# Patient Record
Sex: FEMALE | Race: WHITE | Employment: FULL TIME | URBAN - METROPOLITAN AREA
[De-identification: names, ages, dates, MRNs, and addresses within clinical notes are randomized per-mention and may not be internally consistent; named-entity substitution may affect disease eponyms.]

---

## 2019-03-26 ENCOUNTER — HOSPITAL ENCOUNTER (OUTPATIENT)
Age: 29
Discharge: HOME | End: 2019-03-26
Attending: EMERGENCY MEDICINE
Payer: COMMERCIAL

## 2019-03-26 VITALS
RESPIRATION RATE: 18 BRPM | SYSTOLIC BLOOD PRESSURE: 122 MMHG | HEIGHT: 64 IN | HEART RATE: 65 BPM | DIASTOLIC BLOOD PRESSURE: 70 MMHG | BODY MASS INDEX: 31.58 KG/M2 | TEMPERATURE: 98 F | WEIGHT: 185 LBS | OXYGEN SATURATION: 99 %
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
## 2019-03-26 PROCEDURE — 99203 OFFICE O/P NEW LOW 30 MIN: CPT

## 2019-03-26 RX ORDER — DOXYCYCLINE HYCLATE 100 MG/1
100 CAPSULE ORAL 2 TIMES DAILY
Qty: 20 CAPSULE | Refills: 0 | Status: SHIPPED | OUTPATIENT
Start: 2019-03-26 | End: 2019-04-05

## 2019-03-26 RX ORDER — NORGESTIMATE AND ETHINYL ESTRADIOL 7DAYSX3 28
1 KIT ORAL
COMMUNITY

## 2019-03-26 NOTE — ED PROVIDER NOTES
Patient Seen in: [address]    History   Patient presents with:  Bite (integumentary)    Stated Complaint: right  finger cat bite    HPI    This patient states she sustained a cat bite to her right index finger early toda completely extend the finger complains of pain on  complete passive extension. Has pain with passive flexion at the MCP and PIP joint          MDM   Will treat with oral doxycycline.   Advised patient to elevate the hand and that if symptoms worsen should

## 2024-10-24 ENCOUNTER — HOSPITAL ENCOUNTER (EMERGENCY)
Facility: HOSPITAL | Age: 34
Discharge: HOME | End: 2024-10-24
Attending: EMERGENCY MEDICINE
Payer: COMMERCIAL

## 2024-10-24 VITALS
TEMPERATURE: 98 F | HEIGHT: 64 IN | SYSTOLIC BLOOD PRESSURE: 135 MMHG | WEIGHT: 210 LBS | HEART RATE: 94 BPM | RESPIRATION RATE: 16 BRPM | OXYGEN SATURATION: 97 % | DIASTOLIC BLOOD PRESSURE: 84 MMHG | BODY MASS INDEX: 35.85 KG/M2

## 2024-10-24 DIAGNOSIS — Z20.9 EXPOSURE TO BAT WITHOUT KNOWN BITE: Primary | ICD-10-CM

## 2024-10-24 PROCEDURE — 99282 EMERGENCY DEPT VISIT SF MDM: CPT

## 2024-10-24 PROCEDURE — 99281 EMR DPT VST MAYX REQ PHY/QHP: CPT

## 2024-10-25 NOTE — ED INITIAL ASSESSMENT (HPI)
Pt c/o possible bat exposure. Here with  who has a possible bat bite. Pt saw bat outside house 10/20/24.

## 2024-10-25 NOTE — ED PROVIDER NOTES
Patient Seen in: [facility] Emergency Department    History     Chief Complaint   Patient presents with    Bite       HPI    34-year-old female presents to the ED for possible bat exposure.  She states 4 days ago, she saw a bat flying in her yard and was unsure whether she needed rabies prophylaxis.  She states she did not get any bites.    History from Independent Source:       External Records Reviewed:     History reviewed. History reviewed. No pertinent past medical history.    History reviewed. History reviewed. No pertinent surgical history.      Medications :  Prescriptions Prior to Admission[1]     No family history on file.    Smoking Status:   Social History     Socioeconomic History    Marital status:    Tobacco Use    Smoking status: Never    Smokeless tobacco: Never   Substance and Sexual Activity    Alcohol use: Never    Drug use: Never       Constitutional and vital signs reviewed.      Social History and Family History elements reviewed from today, pertinent positives to the presenting problem noted.    Physical Exam     ED Triage Vitals [10/24/24 1954]   /84   Pulse 94   Resp 18   Temp 98.3 °F (36.8 °C)   Temp src    SpO2 97 %   O2 Device        Physical Exam   Constitutional: AAOx3, well nourished, NAD  Skin: Skin is warm and dry. No rash noted. No erythema.         All measures to prevent infection transmission during my interaction with the patient were taken. The patient was already wearing a droplet mask on my arrival to the room. Personal protective equipment was worn throughout the duration of the exam.      ED Course      Labs Reviewed - No data to display  My Independent Interpretation of EKG (if performed):      Imaging Results Available and Reviewed while in ED: No results found.  ED Medications Administered: Medications - No data to display          MDM     Vitals:    10/24/24 1954   BP: 135/84   Pulse: 94   Resp: 18   Temp: 98.3 °F (36.8 °C)   SpO2: 97%   Weight:  95.3 kg   Height: 162.6 cm (5' 4\")     *I personally reviewed and interpreted all ED vitals.    Independent Interpretation of Studies:     Social Determinants of Health:     Procedures:      Differential/MDM/Shared Decision Making: Differential Diagnosis includes bite, exposure, others.      The patient already  has no past medical history on file.  to contribute to the complexity of this ED evaluation.           Medications, Diagnostics, or Disposition considered but not done:      Management of case was discussed with patient that this would not qualify for rabies prophylaxis.      Condition upon leaving the department: Stable    Disposition and Plan     Clinical Impression:  1. Exposure to bat without known bite        Disposition:  Discharge    Follow-up:  Kumar Jacobs MD  [address] [facility] [id number] [phone]    Call in 2 day(s)        Medications Prescribed:  Current Discharge Medication List                   [1] (Not in a hospital admission)

## (undated) NOTE — LETTER
Date & Time: 3/26/2019, 8:21 AM  Patient: Leticia Le  Encounter Provider(s):    Ashely Shepherd MD       To Whom It May Concern:    Leticia Le was seen and treated in our department on 3/26/2019. She cannot work 3/27/2019.     If you have any